# Patient Record
Sex: MALE | Race: BLACK OR AFRICAN AMERICAN | URBAN - METROPOLITAN AREA
[De-identification: names, ages, dates, MRNs, and addresses within clinical notes are randomized per-mention and may not be internally consistent; named-entity substitution may affect disease eponyms.]

---

## 2024-04-18 ENCOUNTER — HOSPITAL ENCOUNTER (EMERGENCY)
Age: 29
Discharge: HOME | End: 2024-04-18
Attending: EMERGENCY MEDICINE

## 2024-04-18 VITALS
RESPIRATION RATE: 18 BRPM | DIASTOLIC BLOOD PRESSURE: 64 MMHG | TEMPERATURE: 97.7 F | OXYGEN SATURATION: 99 % | HEART RATE: 65 BPM | WEIGHT: 154.32 LBS | SYSTOLIC BLOOD PRESSURE: 125 MMHG | HEIGHT: 69 IN | BODY MASS INDEX: 22.86 KG/M2

## 2024-04-18 DIAGNOSIS — T23.231A PARTIAL THICKNESS BURN OF RIGHT HAND INCLUDING FINGERS, INITIAL ENCOUNTER: Primary | ICD-10-CM

## 2024-04-18 DIAGNOSIS — T23.201A PARTIAL THICKNESS BURN OF RIGHT HAND INCLUDING FINGERS, INITIAL ENCOUNTER: Primary | ICD-10-CM

## 2024-04-18 DIAGNOSIS — T23.202A PARTIAL THICKNESS BURN OF LEFT HAND INCLUDING FINGERS, INITIAL ENCOUNTER: ICD-10-CM

## 2024-04-18 DIAGNOSIS — T23.232A PARTIAL THICKNESS BURN OF LEFT HAND INCLUDING FINGERS, INITIAL ENCOUNTER: ICD-10-CM

## 2024-04-18 PROCEDURE — 90471 IMMUNIZATION ADMIN: CPT | Performed by: EMERGENCY MEDICINE

## 2024-04-18 PROCEDURE — 90715 TDAP VACCINE 7 YRS/> IM: CPT | Performed by: EMERGENCY MEDICINE

## 2024-04-18 PROCEDURE — 99284 EMERGENCY DEPT VISIT MOD MDM: CPT

## 2024-04-18 PROCEDURE — 6360000002 HC RX W HCPCS: Performed by: EMERGENCY MEDICINE

## 2024-04-18 RX ORDER — BACITRACIN ZINC AND POLYMYXIN B SULFATE 500; 1000 [USP'U]/G; [USP'U]/G
OINTMENT TOPICAL
Qty: 15 G | Refills: 1 | Status: SHIPPED | OUTPATIENT
Start: 2024-04-18 | End: 2024-04-25

## 2024-04-18 RX ORDER — HYDROCODONE BITARTRATE AND ACETAMINOPHEN 5; 325 MG/1; MG/1
1 TABLET ORAL EVERY 6 HOURS PRN
Qty: 12 TABLET | Refills: 0 | Status: SHIPPED | OUTPATIENT
Start: 2024-04-18 | End: 2024-04-21

## 2024-04-18 RX ADMIN — TETANUS TOXOID, REDUCED DIPHTHERIA TOXOID AND ACELLULAR PERTUSSIS VACCINE, ADSORBED 0.5 ML: 5; 2.5; 8; 8; 2.5 SUSPENSION INTRAMUSCULAR at 18:17

## 2024-04-18 ASSESSMENT — PAIN - FUNCTIONAL ASSESSMENT: PAIN_FUNCTIONAL_ASSESSMENT: NONE - DENIES PAIN
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
## 2024-10-22 ENCOUNTER — HOSPITAL ENCOUNTER (EMERGENCY)
Age: 29
Discharge: HOME | End: 2024-10-22
Attending: EMERGENCY MEDICINE

## 2024-10-22 VITALS
HEIGHT: 69 IN | SYSTOLIC BLOOD PRESSURE: 110 MMHG | RESPIRATION RATE: 18 BRPM | OXYGEN SATURATION: 97 % | BODY MASS INDEX: 22.22 KG/M2 | HEART RATE: 67 BPM | DIASTOLIC BLOOD PRESSURE: 79 MMHG | WEIGHT: 150 LBS

## 2024-10-22 DIAGNOSIS — S51.811A FOREARM LACERATION, RIGHT, INITIAL ENCOUNTER: Primary | ICD-10-CM

## 2024-10-22 PROCEDURE — 12002 RPR S/N/AX/GEN/TRNK2.6-7.5CM: CPT

## 2024-10-22 PROCEDURE — 99284 EMERGENCY DEPT VISIT MOD MDM: CPT

## 2024-10-22 RX ORDER — CEPHALEXIN 500 MG/1
500 CAPSULE ORAL 2 TIMES DAILY
Qty: 14 CAPSULE | Refills: 0 | Status: SHIPPED | OUTPATIENT
Start: 2024-10-22 | End: 2024-10-29

## 2024-10-22 RX ORDER — LIDOCAINE HYDROCHLORIDE 10 MG/ML
20 INJECTION, SOLUTION INFILTRATION; PERINEURAL ONCE
Status: DISCONTINUED | OUTPATIENT
Start: 2024-10-22 | End: 2024-10-22 | Stop reason: HOSPADM

## 2024-10-22 ASSESSMENT — PAIN SCALES - GENERAL: PAINLEVEL_OUTOF10: 0

## 2024-10-22 ASSESSMENT — PAIN - FUNCTIONAL ASSESSMENT: PAIN_FUNCTIONAL_ASSESSMENT: 0-10

## 2024-10-23 NOTE — ED NOTES
Pt arrived to ED with c/o lac to right under forearm. Pt states he was cutting metal and it kicked back and his arm got cut on the sheet metal. Pt is A&O x4, vitals are stable and breathing is even and non labored. Pt denies pain. Call light is within reach.   Statement Selected

## 2024-10-23 NOTE — ED PROVIDER NOTES
EMERGENCY DEPARTMENT ENCOUNTER   ATTENDING ATTESTATION   Pt Name: Nacho Pillai  MRN: [id number]  Birthdate 1995  Date of evaluation: 10/22/24   Nacho Pillai is a 29 y.o. male with CC: Laceration (Right forearm on sheet metal)    MDM:   I performed a substantive part of the MDM during the patient's E/M visit. I personally made or approved the documented management plan and acknowledge its risk of complications.           CRITICAL CARE:       EKG: All EKG's are interpreted by the Emergency Department Physician who either signs or Co-signs this chart in the absence of a cardiologist.      RADIOLOGY:All plain film, CT, MRI, and formal ultrasound images (except ED bedside ultrasound) are read by the radiologist, see reports below, unless otherwise noted in MDM or here.  No orders to display     LABS: All lab results were reviewed by myself, and all abnormals are listed below.  Labs Reviewed - No data to display  CONSULTS:  None  FINAL IMPRESSION    No diagnosis found.        PASTMEDICAL HISTORY   No past medical history on file.  SURGICAL HISTORY     No past surgical history on file.  CURRENT MEDICATIONS       Previous Medications    No medications on file     ALLERGIES     has No Known Allergies.  FAMILY HISTORY     has no family status information on file.      SOCIAL HISTORY             Erica B Goldberger, MD  Attending Emergency Physician       Goldberger, Erica B, MD  10/22/24 [id number]

## 2024-10-23 NOTE — ED NOTES
Pt provided discharge instructions and educated on prescription.  Pt instructed to follow up with PCP for suture removal and told to return to ED with any new / worsened symptoms.  Pt verbalizes understanding and denies additional questions or concerns at this time.

## 2024-10-23 NOTE — ED PROVIDER NOTES
EMERGENCY DEPARTMENT ENCOUNTER    Pt Name: Nacho Pillai  MRN: [id number]  Birthdate 1995  Date of evaluation: 10/22/24  CHIEF COMPLAINT       Chief Complaint   Patient presents with    Laceration     Right forearm on sheet metal     HISTORY OF PRESENT ILLNESS   HPI       REVIEW OF SYSTEMS     Review of Systems  PASTMEDICAL HISTORY   No past medical history on file.  Past Problem List  There is no problem list on file for this patient.    SURGICAL HISTORY     No past surgical history on file.  CURRENT MEDICATIONS       Discharge Medication List as of 10/22/2024  8:53 PM        ALLERGIES     has No Known Allergies.  FAMILY HISTORY     has no family status information on file.      SOCIAL HISTORY        PHYSICAL EXAM     INITIAL VITALS: /79   Pulse 67   Resp 18   Ht 1.753 m (5' 9\")   Wt 68 kg (150 lb)   SpO2 97%   BMI 22.15 kg/m²    Physical Exam    MEDICAL DECISION MAKING / ED COURSE:   Summary of Patient Presentation:      Patient is a 29-year-old male with laceration to the right forearm which happened just prior to arrival.  Patient states that he was at work when a piece of sheet metal struck him in the right forearm.  Patient denies any numbness tingling or weakness in the extremity.  Tetanus shot is up-to-date.      1)  Number and Complexity of Problems Addressed at this Encounter  Problem List This Visit: Forearm laceration    Differential Diagnosis: Laceration         3)  Treatment and Disposition    Patient assessment: Patient is awake, alert, oriented in no apparent distress.  Minimal active bleeding from the laceration which was well-controlled with pressure.  Wound repaired and patient tolerated procedure well.    Disposition discussion with patient/family, Shared Decision Making: Patient is discharged home in stable condition.  He is to keep the area clean and dry.  Take Keflex as written.  Follow-up with primary care doctor in 7 to 10 days for suture removal.       Lac

## 2024-10-23 NOTE — DISCHARGE INSTRUCTIONS
Keep area clean and dry.  Do not submerge underwater.  Take antibiotics as written.  Follow-up with primary care doctor in 7 to 10 days for suture removal